# Patient Record
Sex: FEMALE | Race: WHITE | HISPANIC OR LATINO | URBAN - METROPOLITAN AREA
[De-identification: names, ages, dates, MRNs, and addresses within clinical notes are randomized per-mention and may not be internally consistent; named-entity substitution may affect disease eponyms.]

---
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
## 2022-03-23 ENCOUNTER — OCCUPATIONAL MEDICINE (OUTPATIENT)
Dept: URGENT CARE | Facility: PHYSICIAN GROUP | Age: 22
End: 2022-03-23
Payer: COMMERCIAL

## 2022-03-23 VITALS
RESPIRATION RATE: 16 BRPM | SYSTOLIC BLOOD PRESSURE: 110 MMHG | TEMPERATURE: 98.1 F | OXYGEN SATURATION: 98 % | HEART RATE: 104 BPM | DIASTOLIC BLOOD PRESSURE: 60 MMHG | HEIGHT: 61 IN | WEIGHT: 121 LBS | BODY MASS INDEX: 22.84 KG/M2

## 2022-03-23 DIAGNOSIS — S00.03XA CONTUSION OF SCALP, INITIAL ENCOUNTER: ICD-10-CM

## 2022-03-23 PROCEDURE — 99213 OFFICE O/P EST LOW 20 MIN: CPT | Performed by: PHYSICIAN ASSISTANT

## 2022-03-23 ASSESSMENT — ENCOUNTER SYMPTOMS
TREMORS: 0
SPEECH CHANGE: 0
ROS SKIN COMMENTS: NO LACERATION OR ABRASION.
BRUISES/BLEEDS EASILY: 0
VOMITING: 0
SENSORY CHANGE: 0
DIZZINESS: 0
TINGLING: 0
BLURRED VISION: 0
NECK PAIN: 0
WEAKNESS: 0
HEADACHES: 1
MYALGIAS: 0
FOCAL WEAKNESS: 0
LOSS OF CONSCIOUSNESS: 0
SEIZURES: 0
NAUSEA: 0
DOUBLE VISION: 0
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
## 2022-03-23 NOTE — LETTER
[facility] [address] [place] [address]  Phone:  [phone] - Fax:  [phone]   [facility] Progress Report and Disability Certification  Date of Service: 3/23/2022   No Show:  No  Date / Time of Next Visit:     Claim Information   Patient Name: Catia Goss  Claim Number:     Employer:   [employer] Date of Injury: 3/23/2022     Insurer / TPA: [employer]  ID / SSN:     Occupation: Manager  Diagnosis: The encounter diagnosis was Contusion of scalp, initial encounter.    Medical Information   Related to Industrial Injury? Yes    Subjective Complaints:  HPI:  DOI: 3/23/22  ROSALIE:  This is a very pleasant 21-year-old female presenting to the clinic after sustaining a work-related injury.  The patient works at [employer].  She states a coworker opened the freezer door as she was walking by.  She ended up hitting her head on the corner of the freezer door.  She made contact with left temporal area of her scalp.  She did not lose consciousness.  She did not fall to the ground.  She states after this incident she has had a mild persistent headache.  Headache is dull and throbbing.  Denies any associated visual change.  No nausea or vomiting.  Notes paresthesias of the extremities.  The patient is not on any blood thinners.  This incident happened 5 hours ago.  No previous injury.  Does not have a second job.    PMH:   No pertinent past medical history to this problem  MEDS:  Medications were reviewed in EMR  ALLERGIES:  Allergies were reviewed in EMR  SOCHX:  Works at [email or web address]  FH:   No pertinent family history to this problem     Objective Findings: Constitutional: Pt is oriented to person, place, and time.  Appears well-developed and well-nourished. No distress.   HENT: There is no abrasion or laceration present on the scalp.  No swelling or hematoma formation.  No discoloration present.  Bilateral TMs pearly gray.  No hemotympanum.  No maher sign  or raccoon eyes.  Mouth/Throat: Oropharynx is clear and moist.   Eyes: Conjunctivae are normal.  EOMs full intact and pain-free.  PERRLA.  Cardiovascular: Normal rate.    Pulmonary/Chest: Effort normal.   Musculoskeletal: Normal range of motion of all extremities.  Neurological: Pt is alert and oriented to person, place, and time. Coordination normal.   Skin: Skin is warm. Pt is not diaphoretic. No erythema.   Psychiatric: Pt has a normal mood and affect.  Behavior is normal.      Pre-Existing Condition(s):     Assessment:   Initial Visit    Status: Discharged /  MMI  Permanent Disability:No    Plan:      Diagnostics:      Comments:       Disability Information   Status: Released to Full Duty    From:  3/23/2022  Through:   Restrictions are:     Physical Restrictions   Sitting:    Standing:    Stooping:    Bending:      Squatting:    Walking:    Climbing:    Pushing:      Pulling:    Other:    Reaching Above Shoulder (L):   Reaching Above Shoulder (R):       Reaching Below Shoulder (L):    Reaching Below Shoulder (R):      Not to exceed Weight Limits   Carrying(hrs):   Weight Limit(lb):   Lifting(hrs):   Weight  Limit(lb):     Comments: Patient is released to return to full duty.  No concern at this time for head injury.  Patient has no focal neurological deficits.  No concern for TBI.  All physical exam findings reassuring.  Tylenol and ibuprofen as needed for pain.  Return to the clinic for any red flag symptoms.  Discharge MMI.    Repetitive Actions   Hands: i.e. Fine Manipulations from Grasping:     Feet: i.e. Operating Foot Controls:     Driving / Operate Machinery:     Health Care Provider’s Original or Electronic Signature  Jordan Baugh P.A.-C. Health Care Provider’s Original or Electronic Signature    Geoff Dennison MD         Clinic Name / Location: [facility] [address] [address] Clinic Phone Number: Dept: [phone]   Appointment Time: 5:55 Pm Visit Start  Time: 6:08 PM   Check-In Time:  6:04 Pm Visit Discharge Time:  6:37   Original-Treating Physician or Chiropractor    Page 2-Insurer/TPA    Page 3-Employer    Page 4-Employee

## 2022-03-24 NOTE — PROGRESS NOTES
Subjective     Catia Goss is a 21 y.o. female who presents with Head Injury (Pt sts a coworker had opened the door to the cooler and she was walking by it when she heard  him say be careful and looked to the right and when she went to look to the left she ran into the corner of the freezer door. Pt denies fainting or losing conscious.  Onset 5 hours. Pt reports headaches. )      HPI:  DOI: 3/23/22  ROSALIE:  This is a very pleasant 21-year-old female presenting to the clinic after sustaining a work-related injury.  The patient works at [employer].  She states a coworker opened the freezer door as she was walking by.  She ended up hitting her head on the corner of the freezer door.  She made contact with left temporal area of her scalp.  She did not lose consciousness.  She did not fall to the ground.  She states after this incident she has had a mild persistent headache.  Headache is dull and throbbing.  Denies any associated visual change.  No nausea or vomiting.  Notes paresthesias of the extremities.  The patient is not on any blood thinners.  This incident happened 5 hours ago.  No previous injury.  Does not have a second job.    PMH:   No pertinent past medical history to this problem  MEDS:  Medications were reviewed in EMR  ALLERGIES:  Allergies were reviewed in EMR  SOCHX:  Works at [employer]  FH:   No pertinent family history to this problem         Review of Systems   Eyes: Negative for blurred vision and double vision.   Gastrointestinal: Negative for nausea and vomiting.   Musculoskeletal: Negative for myalgias and neck pain.   Skin:        No laceration or abrasion.   Neurological: Positive for headaches. Negative for dizziness, tingling, tremors, sensory change, speech change, focal weakness, seizures, loss of consciousness and weakness.   Endo/Heme/Allergies: Does not bruise/bleed easily.              Objective     /60 (BP Location: Left arm, Patient Position: Sitting, BP Cuff Size: Adult)   " Pulse (!) 104   Temp 36.7 °C (98.1 °F) (Temporal)   Resp 16   Ht 1.549 m (5' 1\")   Wt 54.9 kg (121 lb)   SpO2 98%   BMI 22.86 kg/m²      Physical Exam    Constitutional: Pt is oriented to person, place, and time.  Appears well-developed and well-nourished. No distress.   HENT: There is no abrasion or laceration present on the scalp.  No swelling or hematoma formation.  No discoloration present.  Bilateral TMs pearly gray.  No hemotympanum.  No maher sign or raccoon eyes.  Mouth/Throat: Oropharynx is clear and moist.   Eyes: Conjunctivae are normal.  EOMs full intact and pain-free.  PERRLA.  Cardiovascular: Normal rate.    Pulmonary/Chest: Effort normal.   Musculoskeletal: Normal range of motion of all extremities.  Neurological: Pt is alert and oriented to person, place, and time. Coordination normal.   Skin: Skin is warm. Pt is not diaphoretic. No erythema.   Psychiatric: Pt has a normal mood and affect.  Behavior is normal.              Assessment & Plan        1. Contusion of scalp, initial encounter    Patient is released to return to full duty.  No concern at this time for head injury.  Patient has no focal neurological deficits.  No concern for TBI.  All physical exam findings reassuring.  Tylenol and ibuprofen as needed for pain.  Return to the clinic for any red flag symptoms.  Discharge MMI.    Differential diagnosis, natural history, supportive care, and indications for immediate follow-up discussed at length.                   "

## 2024-05-15 ENCOUNTER — APPOINTMENT (OUTPATIENT)
Dept: OBGYN | Facility: CLINIC | Age: 24
End: 2024-05-15
Payer: COMMERCIAL

## 2024-05-17 ENCOUNTER — GYNECOLOGY VISIT (OUTPATIENT)
Dept: OBGYN | Facility: CLINIC | Age: 24
End: 2024-05-17
Payer: COMMERCIAL

## 2024-05-17 ENCOUNTER — HOSPITAL ENCOUNTER (OUTPATIENT)
Facility: MEDICAL CENTER | Age: 24
End: 2024-05-17
Attending: PHYSICIAN ASSISTANT
Payer: COMMERCIAL

## 2024-05-17 VITALS — WEIGHT: 140 LBS | DIASTOLIC BLOOD PRESSURE: 73 MMHG | SYSTOLIC BLOOD PRESSURE: 132 MMHG | BODY MASS INDEX: 26.45 KG/M2

## 2024-05-17 DIAGNOSIS — N93.9 ABNORMAL UTERINE BLEEDING (AUB): ICD-10-CM

## 2024-05-17 DIAGNOSIS — R87.619 ATYPICAL CERVICAL GLANDULAR CELLS: ICD-10-CM

## 2024-05-17 DIAGNOSIS — N89.8 VAGINAL DISCHARGE: ICD-10-CM

## 2024-05-17 PROCEDURE — 3075F SYST BP GE 130 - 139MM HG: CPT | Performed by: PHYSICIAN ASSISTANT

## 2024-05-17 PROCEDURE — 99203 OFFICE O/P NEW LOW 30 MIN: CPT | Performed by: PHYSICIAN ASSISTANT

## 2024-05-17 PROCEDURE — 3078F DIAST BP <80 MM HG: CPT | Performed by: PHYSICIAN ASSISTANT

## 2024-05-17 NOTE — PROGRESS NOTES
ANNUAL GYNECOLOGY VISIT    Chief Complaint  Annual    Subjective  Caita Goss is a 24 y.o. female  irregular LMP due to Paraguard placed  for contraception who presents today for Annual Exam. Pt in to follow up on Pap done 3/2024 at [facility] with finding of AGC. Did have prior Pap in teens in [place] that was normal per pt.     Pt c/o brown thick discharge x 5 months. Mild intermittent odor. No itching. Pt had Paraguard placed . Reports regular cycles until 2023. Since then has not had a true cycle just irregular spotting that has been consistent since 2024. Spotting is light but bothersome as it happens daily. Prior to Paraguard cycles were typically monthly with irregular flow and duration. Would occasionally skip cycles. Never had intermenstrual spotting. Has previously tried OCP but stopped due to non-compliance.     Preventive Care   Immunization History   Administered Date(s) Administered    DTP - Historical vaccine 2004, 2006    DTP/Hib/Hep B - HISTORICAL DATA 2002, 2002, 2002    HPV Quadrivalent Vaccine (GARDASIL) - HISTORICAL DATA 10/02/2012, 2013, 10/30/2013    Hepatitis A Vaccine, Ped/Adol 2013, 10/30/2013    Influenza LAIV (Nasal) - HISTORICAL DATA 2013    Influenza Vaccine Quad Nasal 10/30/2013    Influenza, Unspecified - HISTORICAL DATA 2004, 2005    MMR Vaccine 2003, 2008    MODERNA SARS-COV-2 VACCINE (12+) 2021, 05/10/2021    Meningococcal Conjugate Vaccine MCV4 (MENVEO) 2013    OPV TRIVALENT - HISTORICAL DATA (GIVEN PRIOR TO MAY 2016) 2002, 2002, 2002, 10/15/2004, 2006    TD Vaccine 10/02/2012    Tdap Vaccine 2013, 2014    Varicella Vaccine Live 2013, 2013       Guardasil HPV vaccine: UTD    Gynecology History and ROS  Current Sexual Activity: yes - monogamous male partner   History of sexually transmitted diseases? no  Abnormal  discharge? yes - see HPI  Current Contraception:  Paraguard     Menstrual History  Irregular menses- see HPI    Pap History  Last pap smear: 3/13/2024 AGC  History of moderate or severe dysplasia: no    Cancer Risk Assessement:  Family history of:   - Breast cancer: no   - Ovarian cancer: no   - Uterine cancer: no   - Colon cancer: no    Obstetric History  OB History    Para Term  AB Living   0 0 0 0 0 0   SAB IAB Ectopic Molar Multiple Live Births   0 0 0 0 0 0       Past Medical History  History reviewed. No pertinent past medical history.    Past Surgical History  History reviewed. No pertinent surgical history.    Social History  Social History     Tobacco Use    Smoking status: Never    Smokeless tobacco: Never   Vaping Use    Vaping status: Never Used   Substance Use Topics    Alcohol use: Yes     Comment: occ    Drug use: Never        Family History  Family History   Problem Relation Age of Onset    No Known Problems Mother     No Known Problems Father     No Known Problems Sister     No Known Problems Brother     No Known Problems Maternal Aunt     No Known Problems Maternal Uncle     No Known Problems Paternal Aunt     No Known Problems Paternal Uncle     No Known Problems Maternal Grandmother     No Known Problems Maternal Grandfather     No Known Problems Paternal Grandmother     No Known Problems Paternal Grandfather     No Known Problems Daughter     No Known Problems Son     No Known Problems Other     Lung Disease Neg Hx     Genetic Disorder Neg Hx     Cancer Neg Hx     Ovarian Cancer Neg Hx     Tubal Cancer Neg Hx     Peritoneal Cancer Neg Hx     Colorectal Cancer Neg Hx     Breast Cancer Neg Hx     Bilateral Breast Cancer Neg Hx     BRCA 1 Neg Hx     BRCA 1 Carrier  Neg Hx     BRCA 2 Neg Hx     BRCA 2 Carrier Neg Hx     Psychiatric Illness Neg Hx     Dementia Neg Hx     Diabetes Neg Hx     Heart Disease Neg Hx     Hypertension Neg Hx     Hyperlipidemia Neg Hx     Stroke Neg Hx      Glaucoma Neg Hx     Autism Neg Hx     Arterial Aneurysm Neg Hx     Drug abuse Neg Hx     Alcohol abuse Neg Hx        Home Medications  No current outpatient medications on file.       Allergies/Reactions  No Known Allergies    ROS  Positive ROS: see HPI  Gen: no fevers or chills, no significant weight loss or gain, excessive fatigue  Respiratory:  no cough or dyspnea  Cardiac:  no chest pain, no palpitations, no syncope  Breast: no breast discharge, pain, lump or skin changes  GI:  no heartburn, no abdominal pain, no nausea or vomiting  Urinary: no dysuria, urgency, frequency, incontinence   Psych: no depression or anxiety  Neuro: no migraines with aura, fainting spells, numbness or tingling  Extremities: no joint pain, persistently swollen ankles, recurrent leg cramps      Physical Examination:  Vital Signs: /73   Wt 140 lb   BMI 26.45 kg/m²       Constitutional: The patient is well developed and well nourished.  Psychiatric: Patient is oriented to time place and person.   Skin: No rash observed.  Neck: Appears symmetric. Thyroid normal size  Respiratory: normal effort  Breast: declines as pt had annual exam at [facility]   Abdomen: Soft, non-tender.  Pelvic Exam:      Vulva: external female genitalia are normal in appearance. No lesions     Urethra - no lesions, no erythema     Vagina: moist, pink, normal ruggae, light blood present  Extremeties: Legs are symmetric and without tenderness. There is no edema present.        Assessment & Plan  Catia Goss is a 24 y.o. female who presents today for Annual Gyn Exam.     1. Atypical cervical glandular cells    - Reviewed results of Pap from [facility] (in media). Reviewed ASCCP guidelines recommending colposcopy.  - Schedule for colposcopy. Discussed procedure, expectations, and possible prognoses and subsequent procedures vs monitoring.     2. Abnormal uterine bleeding (AUB)    - Advised the brown discharge is likely spotting due to  abnormal bleeding from the Paraguard. Discussed options of continuing with Paraguard vs other contraceptive options. If Vaginal swabs are negative pt would like to remove Paraguard as the spotting is bothersome. Recommended Mirena IUD as she has trouble with compliance with the pills and would like less bleeding.     3. Vaginal discharge    - Reassured pt the 'brown discharge' is likely spotting from Paraguard which can cause irregular cycles.   - VAGINAL PATHOGENS DNA PANEL; Future  - Chlamydia/GC, PCR (Urine); Future          Return: for ayde Lopez P.A.-C.  [facility]

## 2024-05-17 NOTE — PROGRESS NOTES
Patient here for GYN exam. Review abnormal pap and establish care   LMP= 11/30/23  BCM: Paragard dec 2021 or 2022  Last pap: 3/13/24 abnormal, first pap at 20 in 2020 in [facility]   Phone number: [phone] (home)   Pharmacy verified

## 2024-05-18 LAB
C TRACH DNA GENITAL QL NAA+PROBE: NEGATIVE
CANDIDA DNA VAG QL PROBE+SIG AMP: NEGATIVE
G VAGINALIS DNA VAG QL PROBE+SIG AMP: NEGATIVE
N GONORRHOEA DNA GENITAL QL NAA+PROBE: NEGATIVE
SPECIMEN SOURCE: NORMAL
T VAGINALIS DNA VAG QL PROBE+SIG AMP: NEGATIVE

## 2024-06-07 ENCOUNTER — GYNECOLOGY VISIT (OUTPATIENT)
Dept: OBGYN | Facility: CLINIC | Age: 24
End: 2024-06-07
Payer: COMMERCIAL

## 2024-06-07 ENCOUNTER — HOSPITAL ENCOUNTER (OUTPATIENT)
Facility: MEDICAL CENTER | Age: 24
End: 2024-06-07
Attending: OBSTETRICS & GYNECOLOGY
Payer: COMMERCIAL

## 2024-06-07 VITALS
DIASTOLIC BLOOD PRESSURE: 72 MMHG | WEIGHT: 142.4 LBS | SYSTOLIC BLOOD PRESSURE: 114 MMHG | HEIGHT: 60 IN | BODY MASS INDEX: 27.96 KG/M2

## 2024-06-07 DIAGNOSIS — R87.619 ATYPICAL CERVICAL GLANDULAR CELLS: ICD-10-CM

## 2024-06-07 LAB — PATHOLOGY CONSULT NOTE: NORMAL

## 2024-06-07 PROCEDURE — 57454 BX/CURETT OF CERVIX W/SCOPE: CPT | Performed by: OBSTETRICS & GYNECOLOGY

## 2024-06-07 PROCEDURE — 88305 TISSUE EXAM BY PATHOLOGIST: CPT

## 2024-06-07 RX ORDER — CLINDAMYCIN HYDROCHLORIDE 300 MG/1
300 CAPSULE ORAL 3 TIMES DAILY
COMMUNITY

## 2024-06-07 NOTE — PROGRESS NOTES
COLPOSCOPY PROCEDURE NOTE:  Catia Goss is a 24 y.o. female presents today for colposcopy evaluation for history of atypical glandular cells.    /72 (BP Location: Left arm, Patient Position: Sitting, BP Cuff Size: Adult)   Ht 5'   Wt 142 lb 6.4 oz   BMI 27.81 kg/m²     PROCEDURE:  Indication: ALLEN  Adequacy: colposcopy adequate.  SCJ Visibility: Transformation zone visualized    Procedure: cervix swabbed with acetic acid solution.  No external lesions.  No biopsies obtained.  ECC collected.  Hemostasis achieved with Monsel's.  Findings: No external AWE cervical lesions      ASSESSMENT:   Atypical glandular cells noted on previous Pap smear.  No external lesions identified.  She does have a ParaGard IUD which may be contributing to the findings.    PLAN:   specimens labelled and sent to Pathology, will base further treatment on Pathology findings, and post biopsy instructions given to patient  If the colposcopy is negative, she will follow-up in 6 months for repeat Pap smear given the nature of atypical glandular cells.  Ganesh Lazaro M.D.

## 2024-06-07 NOTE — PROGRESS NOTES
Pt is here today for a procedure.   Colposcopy   Pap done 3/2024 at [facility] with finding of AGC  Phone/pharmacy verified

## 2024-07-26 ENCOUNTER — OFFICE VISIT (OUTPATIENT)
Dept: URGENT CARE | Facility: CLINIC | Age: 24
End: 2024-07-26
Payer: COMMERCIAL

## 2024-07-26 VITALS
HEART RATE: 118 BPM | DIASTOLIC BLOOD PRESSURE: 72 MMHG | SYSTOLIC BLOOD PRESSURE: 112 MMHG | BODY MASS INDEX: 26.06 KG/M2 | WEIGHT: 138 LBS | OXYGEN SATURATION: 98 % | RESPIRATION RATE: 16 BRPM | HEIGHT: 61 IN | TEMPERATURE: 99.9 F

## 2024-07-26 DIAGNOSIS — R50.9 FEVER, UNSPECIFIED FEVER CAUSE: ICD-10-CM

## 2024-07-26 DIAGNOSIS — B34.9 VIRAL ILLNESS: ICD-10-CM

## 2024-07-26 LAB
FLUAV RNA SPEC QL NAA+PROBE: NEGATIVE
FLUBV RNA SPEC QL NAA+PROBE: NEGATIVE
RSV RNA SPEC QL NAA+PROBE: NEGATIVE
S PYO DNA SPEC NAA+PROBE: NOT DETECTED
SARS-COV-2 RNA RESP QL NAA+PROBE: NEGATIVE

## 2024-07-26 PROCEDURE — 3078F DIAST BP <80 MM HG: CPT | Performed by: PHYSICIAN ASSISTANT

## 2024-07-26 PROCEDURE — 3074F SYST BP LT 130 MM HG: CPT | Performed by: PHYSICIAN ASSISTANT

## 2024-07-26 PROCEDURE — 99214 OFFICE O/P EST MOD 30 MIN: CPT | Performed by: PHYSICIAN ASSISTANT

## 2024-07-26 PROCEDURE — 0241U POCT CEPHEID COV-2, FLU A/B, RSV - PCR: CPT | Performed by: PHYSICIAN ASSISTANT

## 2024-07-26 PROCEDURE — 87651 STREP A DNA AMP PROBE: CPT | Performed by: PHYSICIAN ASSISTANT

## 2024-07-26 RX ORDER — TRIAMCINOLONE ACETONIDE 1 MG/G
CREAM TOPICAL
COMMUNITY
Start: 2024-06-25

## 2024-07-26 RX ORDER — AMOXICILLIN 500 MG/1
CAPSULE ORAL
COMMUNITY
Start: 2024-06-22

## 2024-07-26 RX ORDER — AZITHROMYCIN 250 MG/1
TABLET, FILM COATED ORAL
COMMUNITY
Start: 2024-06-24

## 2024-07-26 RX ORDER — FAMOTIDINE 20 MG/1
TABLET, FILM COATED ORAL
COMMUNITY
Start: 2024-06-25

## 2024-07-26 RX ORDER — IBUPROFEN 800 MG/1
800 TABLET ORAL EVERY 8 HOURS PRN
COMMUNITY
Start: 2024-06-22

## 2024-07-26 RX ORDER — CETIRIZINE HYDROCHLORIDE 10 MG/1
TABLET ORAL
COMMUNITY
Start: 2024-06-25

## 2024-07-26 RX ORDER — CHLORHEXIDINE GLUCONATE ORAL RINSE 1.2 MG/ML
SOLUTION DENTAL
COMMUNITY
Start: 2024-06-24

## 2024-07-26 ASSESSMENT — ENCOUNTER SYMPTOMS
VOMITING: 0
CHILLS: 1
SORE THROAT: 1
ABDOMINAL PAIN: 0
DIARRHEA: 0
COUGH: 0
FLANK PAIN: 0
MYALGIAS: 1
NAUSEA: 0
FEVER: 1

## 2025-08-20 ENCOUNTER — GYNECOLOGY VISIT (OUTPATIENT)
Dept: OBGYN | Facility: CLINIC | Age: 25
End: 2025-08-20

## 2025-08-20 ENCOUNTER — HOSPITAL ENCOUNTER (OUTPATIENT)
Facility: MEDICAL CENTER | Age: 25
End: 2025-08-20
Attending: MIDWIFE

## 2025-08-20 VITALS — BODY MASS INDEX: 26.64 KG/M2 | WEIGHT: 141 LBS | SYSTOLIC BLOOD PRESSURE: 100 MMHG | DIASTOLIC BLOOD PRESSURE: 60 MMHG

## 2025-08-20 DIAGNOSIS — T83.89XA MENORRHAGIA DUE TO INTRAUTERINE DEVICE (IUD) (HCC): ICD-10-CM

## 2025-08-20 DIAGNOSIS — N94.6 PAINFUL MENSTRUAL PERIODS: ICD-10-CM

## 2025-08-20 DIAGNOSIS — Z01.419 WELL WOMAN EXAM WITH ROUTINE GYNECOLOGICAL EXAM: Primary | ICD-10-CM

## 2025-08-20 DIAGNOSIS — N92.0 MENORRHAGIA DUE TO INTRAUTERINE DEVICE (IUD) (HCC): ICD-10-CM

## 2025-08-20 LAB
POCT INT CON NEG: NEGATIVE
POCT INT CON POS: POSITIVE
POCT URINE PREGNANCY TEST: NEGATIVE

## 2025-08-20 PROCEDURE — 88175 CYTOPATH C/V AUTO FLUID REDO: CPT

## 2025-08-20 PROCEDURE — 87491 CHLMYD TRACH DNA AMP PROBE: CPT

## 2025-08-20 PROCEDURE — 87591 N.GONORRHOEAE DNA AMP PROB: CPT

## 2025-08-21 DIAGNOSIS — Z01.419 WELL WOMAN EXAM WITH ROUTINE GYNECOLOGICAL EXAM: ICD-10-CM

## 2025-08-21 LAB
C TRACH DNA GENITAL QL NAA+PROBE: NEGATIVE
N GONORRHOEA DNA GENITAL QL NAA+PROBE: NEGATIVE
SPECIMEN SOURCE: NORMAL

## 2025-08-26 LAB — THINPREP PAP, CYTOLOGY NL11781: NORMAL
